# Patient Record
Sex: MALE | Race: WHITE | NOT HISPANIC OR LATINO | Employment: FULL TIME | URBAN - METROPOLITAN AREA
[De-identification: names, ages, dates, MRNs, and addresses within clinical notes are randomized per-mention and may not be internally consistent; named-entity substitution may affect disease eponyms.]

---

## 2019-02-02 ENCOUNTER — OFFICE VISIT (OUTPATIENT)
Dept: URGENT CARE | Facility: CLINIC | Age: 54
End: 2019-02-02
Payer: COMMERCIAL

## 2019-02-02 VITALS
SYSTOLIC BLOOD PRESSURE: 146 MMHG | TEMPERATURE: 98 F | RESPIRATION RATE: 18 BRPM | WEIGHT: 305 LBS | OXYGEN SATURATION: 98 % | HEART RATE: 88 BPM | DIASTOLIC BLOOD PRESSURE: 85 MMHG

## 2019-02-02 DIAGNOSIS — J01.90 ACUTE SINUSITIS, RECURRENCE NOT SPECIFIED, UNSPECIFIED LOCATION: Primary | ICD-10-CM

## 2019-02-02 DIAGNOSIS — H10.023 OTHER MUCOPURULENT CONJUNCTIVITIS OF BOTH EYES: ICD-10-CM

## 2019-02-02 PROCEDURE — 96372 THER/PROPH/DIAG INJ SC/IM: CPT | Mod: S$GLB,,, | Performed by: FAMILY MEDICINE

## 2019-02-02 PROCEDURE — 96372 PR INJECTION,THERAP/PROPH/DIAG2ST, IM OR SUBCUT: ICD-10-PCS | Mod: S$GLB,,, | Performed by: FAMILY MEDICINE

## 2019-02-02 PROCEDURE — 99214 PR OFFICE/OUTPT VISIT, EST, LEVL IV, 30-39 MIN: ICD-10-PCS | Mod: 25,S$GLB,, | Performed by: FAMILY MEDICINE

## 2019-02-02 PROCEDURE — 99214 OFFICE O/P EST MOD 30 MIN: CPT | Mod: 25,S$GLB,, | Performed by: FAMILY MEDICINE

## 2019-02-02 RX ORDER — BETAMETHASONE SODIUM PHOSPHATE AND BETAMETHASONE ACETATE 3; 3 MG/ML; MG/ML
9 INJECTION, SUSPENSION INTRA-ARTICULAR; INTRALESIONAL; INTRAMUSCULAR; SOFT TISSUE
Status: COMPLETED | OUTPATIENT
Start: 2019-02-02 | End: 2019-02-02

## 2019-02-02 RX ORDER — METFORMIN HYDROCHLORIDE 500 MG/1
TABLET ORAL
Refills: 3 | COMMUNITY
Start: 2019-01-03

## 2019-02-02 RX ORDER — ALLOPURINOL 100 MG/1
TABLET ORAL
Refills: 3 | COMMUNITY
Start: 2019-01-03

## 2019-02-02 RX ORDER — GENTAMICIN SULFATE 3 MG/ML
1 SOLUTION/ DROPS OPHTHALMIC EVERY 4 HOURS
Qty: 5 ML | Refills: 0 | Status: SHIPPED | OUTPATIENT
Start: 2019-02-02 | End: 2023-02-22

## 2019-02-02 RX ORDER — AMOXICILLIN AND CLAVULANATE POTASSIUM 875; 125 MG/1; MG/1
1 TABLET, FILM COATED ORAL 2 TIMES DAILY
Qty: 20 TABLET | Refills: 0 | Status: SHIPPED | OUTPATIENT
Start: 2019-02-02 | End: 2019-02-12

## 2019-02-02 RX ADMIN — BETAMETHASONE SODIUM PHOSPHATE AND BETAMETHASONE ACETATE 9 MG: 3; 3 INJECTION, SUSPENSION INTRA-ARTICULAR; INTRALESIONAL; INTRAMUSCULAR; SOFT TISSUE at 11:02

## 2019-02-02 NOTE — PROGRESS NOTES
Subjective:       Patient ID: Jeffrey Carlson is a 53 y.o. male.    Vitals:  weight is 138.3 kg (305 lb) (abnormal). His oral temperature is 98 °F (36.7 °C). His blood pressure is 146/85 (abnormal) and his pulse is 88. His respiration is 18 and oxygen saturation is 98%.     Chief Complaint: Sinus Problem and Eye Problem    This is a 53 y.o. male who presents today with a chief complaint of sinus pressure with drainage and bilateral eye discharge. Using Refresh eye drops. Taking otc generic cough meds and Aleve.      Sinus Problem   This is a new problem. The current episode started in the past 7 days. The problem has been gradually worsening since onset. There has been no fever. His pain is at a severity of 5/10. The pain is moderate. Associated symptoms include congestion, coughing, headaches, sinus pressure and sneezing. Pertinent negatives include no chills, diaphoresis, ear pain, hoarse voice, shortness of breath or sore throat. Past treatments include sitting up. The treatment provided no relief.   Eye Problem    Both eyes are affected.This is a new problem. The current episode started in the past 7 days. The problem occurs daily. The problem has been gradually worsening. There was no injury mechanism. The pain is at a severity of 4/10. The pain is mild. There is no known exposure to pink eye. Associated symptoms include an eye discharge, eye redness and itching. Pertinent negatives include no fever, nausea or vomiting. He has tried eye drops for the symptoms. The treatment provided no relief.       Constitution: Positive for fatigue. Negative for chills, sweating and fever.   HENT: Positive for congestion and sinus pressure. Negative for ear pain, sinus pain, sore throat and voice change.    Neck: Negative for painful lymph nodes.   Eyes: Positive for eye discharge, eye itching and eye redness.   Respiratory: Positive for cough and sputum production. Negative for chest tightness, bloody sputum, COPD,  shortness of breath, stridor, wheezing and asthma.    Gastrointestinal: Negative for nausea and vomiting.   Musculoskeletal: Negative for muscle ache.   Skin: Negative for rash.   Allergic/Immunologic: Positive for sneezing and flu shot. Negative for seasonal allergies and asthma.   Neurological: Positive for headaches.   Hematologic/Lymphatic: Negative for swollen lymph nodes.       Objective:      Physical Exam   Constitutional: He appears well-developed and well-nourished.   HENT:   Head: Normocephalic and atraumatic.   Eyes: EOM are normal. Pupils are equal, round, and reactive to light.   Neck: Normal range of motion. Neck supple.   Cardiovascular: Normal rate, regular rhythm and normal heart sounds.   Pulmonary/Chest: Effort normal and breath sounds normal.   Abdominal: Soft.   Nursing note and vitals reviewed.      Assessment:       1. Acute sinusitis, recurrence not specified, unspecified location    2. Other mucopurulent conjunctivitis of both eyes        Plan:         Acute sinusitis, recurrence not specified, unspecified location  -     betamethasone acetate-betamethasone sodium phosphate injection 9 mg  -     amoxicillin-clavulanate 875-125mg (AUGMENTIN) 875-125 mg per tablet; Take 1 tablet by mouth 2 (two) times daily. for 10 days  Dispense: 20 tablet; Refill: 0    Other mucopurulent conjunctivitis of both eyes  -     gentamicin (GARAMYCIN) 0.3 % ophthalmic solution; Place 1 drop into both eyes every 4 (four) hours.  Dispense: 5 mL; Refill: 0    recommend Mucinex OTC and increased fluids

## 2019-02-02 NOTE — PATIENT INSTRUCTIONS
Sinusitis (Antibiotic Treatment)    The sinuses are air-filled spaces within the bones of the face. They connect to the inside of the nose. Sinusitis is an inflammation of the tissue lining the sinus cavity. Sinus inflammation can occur during a cold. It can also be due to allergies to pollens and other particles in the air. Sinusitis can cause symptoms of sinus congestion and fullness. A sinus infection causes fever, headache and facial pain. There is often green or yellow drainage from the nose or into the back of the throat (post-nasal drip). You have been given antibiotics to treat this condition.  Home care:  · Take the full course of antibiotics as instructed. Do not stop taking them, even if you feel better.  · Drink plenty of water, hot tea, and other liquids. This may help thin mucus. It also may promote sinus drainage.  · Heat may help soothe painful areas of the face. Use a towel soaked in hot water. Or,  the shower and direct the hot spray onto your face. Using a vaporizer along with a menthol rub at night may also help.   · An expectorant containing guaifenesin may help thin the mucus and promote drainage from the sinuses.  · Over-the-counter decongestants may be used unless a similar medicine was prescribed. Nasal sprays work the fastest. Use one that contains phenylephrine or oxymetazoline. First blow the nose gently. Then use the spray. Do not use these medicines more often than directed on the label or symptoms may get worse. You may also use tablets containing pseudoephedrine. Avoid products that combine ingredients, because side effects may be increased. Read labels. You can also ask the pharmacist for help. (NOTE: Persons with high blood pressure should not use decongestants. They can raise blood pressure.)  · Over-the-counter antihistamines may help if allergies contributed to your sinusitis.    · Do not use nasal rinses or irrigation during an acute sinus infection, unless told to by  your health care provider. Rinsing may spread the infection to other sinuses.  · Use acetaminophen or ibuprofen to control pain, unless another pain medicine was prescribed. (If you have chronic liver or kidney disease or ever had a stomach ulcer, talk with your doctor before using these medicines. Aspirin should never be used in anyone under 18 years of age who is ill with a fever. It may cause severe liver damage.)  · Don't smoke. This can worsen symptoms.  Follow-up care  Follow up with your healthcare provider or our staff if you are not improving within the next week.  When to seek medical advice  Call your healthcare provider if any of these occur:  · Facial pain or headache becoming more severe  · Stiff neck  · Unusual drowsiness or confusion  · Swelling of the forehead or eyelids  · Vision problems, including blurred or double vision  · Fever of 100.4ºF (38ºC) or higher, or as directed by your healthcare provider  · Seizure  · Breathing problems  · Symptoms not resolving within 10 days  Date Last Reviewed: 4/13/2015  © 2623-3565 [employer]. [address] [place] [address], [place] [address]. All rights reserved. This information is not intended as a substitute for professional medical care. Always follow your healthcare professional's instructions.

## 2022-12-17 ENCOUNTER — HOSPITAL ENCOUNTER (INPATIENT)
Facility: HOSPITAL | Age: 57
LOS: 1 days | Discharge: HOME | DRG: 392 | End: 2022-12-19
Attending: EMERGENCY MEDICINE | Admitting: STUDENT IN AN ORGANIZED HEALTH CARE EDUCATION/TRAINING PROGRAM
Payer: COMMERCIAL

## 2022-12-17 DIAGNOSIS — K56.600 PARTIAL SMALL BOWEL OBSTRUCTION: ICD-10-CM

## 2022-12-17 DIAGNOSIS — R10.13 EPIGASTRIC PAIN: ICD-10-CM

## 2022-12-17 DIAGNOSIS — R10.9 ABDOMINAL PAIN: ICD-10-CM

## 2022-12-17 DIAGNOSIS — R19.7 DIARRHEA, UNSPECIFIED TYPE: Primary | ICD-10-CM

## 2022-12-17 LAB
ALBUMIN SERPL BCP-MCNC: 3.4 G/DL (ref 3.5–5.2)
ALP SERPL-CCNC: 53 U/L (ref 55–135)
ALT SERPL W/O P-5'-P-CCNC: 16 U/L (ref 10–44)
ANION GAP SERPL CALC-SCNC: 11 MMOL/L (ref 8–16)
AST SERPL-CCNC: 13 U/L (ref 10–40)
BASOPHILS # BLD AUTO: 0.09 K/UL (ref 0–0.2)
BASOPHILS NFR BLD: 0.8 % (ref 0–1.9)
BILIRUB SERPL-MCNC: 0.5 MG/DL (ref 0.1–1)
BILIRUB UR QL STRIP: NEGATIVE
BUN SERPL-MCNC: 14 MG/DL (ref 6–20)
CALCIUM SERPL-MCNC: 7.9 MG/DL (ref 8.7–10.5)
CHLORIDE SERPL-SCNC: 108 MMOL/L (ref 95–110)
CLARITY UR: CLEAR
CO2 SERPL-SCNC: 21 MMOL/L (ref 23–29)
COLOR UR: YELLOW
CREAT SERPL-MCNC: 0.7 MG/DL (ref 0.5–1.4)
DIFFERENTIAL METHOD: ABNORMAL
EOSINOPHIL # BLD AUTO: 0.1 K/UL (ref 0–0.5)
EOSINOPHIL NFR BLD: 1 % (ref 0–8)
ERYTHROCYTE [DISTWIDTH] IN BLOOD BY AUTOMATED COUNT: 13 % (ref 11.5–14.5)
EST. GFR  (NO RACE VARIABLE): >60 ML/MIN/1.73 M^2
GLUCOSE SERPL-MCNC: 169 MG/DL (ref 70–110)
GLUCOSE UR QL STRIP: NEGATIVE
HCT VFR BLD AUTO: 45.4 % (ref 40–54)
HGB BLD-MCNC: 15.3 G/DL (ref 14–18)
HGB UR QL STRIP: ABNORMAL
IMM GRANULOCYTES # BLD AUTO: 0.05 K/UL (ref 0–0.04)
IMM GRANULOCYTES NFR BLD AUTO: 0.5 % (ref 0–0.5)
KETONES UR QL STRIP: NEGATIVE
LEUKOCYTE ESTERASE UR QL STRIP: NEGATIVE
LIPASE SERPL-CCNC: 21 U/L (ref 4–60)
LYMPHOCYTES # BLD AUTO: 1.6 K/UL (ref 1–4.8)
LYMPHOCYTES NFR BLD: 15.3 % (ref 18–48)
MCH RBC QN AUTO: 32.6 PG (ref 27–31)
MCHC RBC AUTO-ENTMCNC: 33.7 G/DL (ref 32–36)
MCV RBC AUTO: 97 FL (ref 82–98)
MICROSCOPIC COMMENT: ABNORMAL
MONOCYTES # BLD AUTO: 1 K/UL (ref 0.3–1)
MONOCYTES NFR BLD: 9.3 % (ref 4–15)
NEUTROPHILS # BLD AUTO: 7.8 K/UL (ref 1.8–7.7)
NEUTROPHILS NFR BLD: 73.1 % (ref 38–73)
NITRITE UR QL STRIP: NEGATIVE
NRBC BLD-RTO: 0 /100 WBC
PH UR STRIP: 6 [PH] (ref 5–8)
PLATELET # BLD AUTO: 249 K/UL (ref 150–450)
PMV BLD AUTO: 11.8 FL (ref 9.2–12.9)
POTASSIUM SERPL-SCNC: 3.7 MMOL/L (ref 3.5–5.1)
PROT SERPL-MCNC: 6.4 G/DL (ref 6–8.4)
PROT UR QL STRIP: ABNORMAL
RBC # BLD AUTO: 4.7 M/UL (ref 4.6–6.2)
RBC #/AREA URNS HPF: 44 /HPF (ref 0–4)
SODIUM SERPL-SCNC: 140 MMOL/L (ref 136–145)
SP GR UR STRIP: 1.02 (ref 1–1.03)
TROPONIN I SERPL DL<=0.01 NG/ML-MCNC: 0.03 NG/ML (ref 0–0.03)
URN SPEC COLLECT METH UR: ABNORMAL
UROBILINOGEN UR STRIP-ACNC: NEGATIVE EU/DL
WBC # BLD AUTO: 10.7 K/UL (ref 3.9–12.7)

## 2022-12-17 PROCEDURE — 93005 ELECTROCARDIOGRAM TRACING: CPT

## 2022-12-17 PROCEDURE — G0378 HOSPITAL OBSERVATION PER HR: HCPCS

## 2022-12-17 PROCEDURE — 25000003 PHARM REV CODE 250: Performed by: EMERGENCY MEDICINE

## 2022-12-17 PROCEDURE — 80053 COMPREHEN METABOLIC PANEL: CPT | Performed by: EMERGENCY MEDICINE

## 2022-12-17 PROCEDURE — 83690 ASSAY OF LIPASE: CPT | Performed by: EMERGENCY MEDICINE

## 2022-12-17 PROCEDURE — 81000 URINALYSIS NONAUTO W/SCOPE: CPT | Performed by: EMERGENCY MEDICINE

## 2022-12-17 PROCEDURE — 93010 ELECTROCARDIOGRAM REPORT: CPT | Mod: ,,, | Performed by: INTERNAL MEDICINE

## 2022-12-17 PROCEDURE — 96361 HYDRATE IV INFUSION ADD-ON: CPT

## 2022-12-17 PROCEDURE — 96374 THER/PROPH/DIAG INJ IV PUSH: CPT

## 2022-12-17 PROCEDURE — 96376 TX/PRO/DX INJ SAME DRUG ADON: CPT

## 2022-12-17 PROCEDURE — 96375 TX/PRO/DX INJ NEW DRUG ADDON: CPT

## 2022-12-17 PROCEDURE — 84484 ASSAY OF TROPONIN QUANT: CPT | Performed by: EMERGENCY MEDICINE

## 2022-12-17 PROCEDURE — 85025 COMPLETE CBC W/AUTO DIFF WBC: CPT | Performed by: EMERGENCY MEDICINE

## 2022-12-17 PROCEDURE — 25500020 PHARM REV CODE 255: Performed by: EMERGENCY MEDICINE

## 2022-12-17 PROCEDURE — 99285 EMERGENCY DEPT VISIT HI MDM: CPT | Mod: 25

## 2022-12-17 PROCEDURE — 63600175 PHARM REV CODE 636 W HCPCS: Performed by: EMERGENCY MEDICINE

## 2022-12-17 PROCEDURE — 93010 EKG 12-LEAD: ICD-10-PCS | Mod: ,,, | Performed by: INTERNAL MEDICINE

## 2022-12-17 RX ORDER — SODIUM CHLORIDE 9 MG/ML
1000 INJECTION, SOLUTION INTRAVENOUS
Status: COMPLETED | OUTPATIENT
Start: 2022-12-17 | End: 2022-12-17

## 2022-12-17 RX ORDER — MORPHINE SULFATE 4 MG/ML
4 INJECTION, SOLUTION INTRAMUSCULAR; INTRAVENOUS
Status: COMPLETED | OUTPATIENT
Start: 2022-12-17 | End: 2022-12-17

## 2022-12-17 RX ORDER — FAMOTIDINE 10 MG/ML
20 INJECTION INTRAVENOUS EVERY 12 HOURS
Status: DISCONTINUED | OUTPATIENT
Start: 2022-12-17 | End: 2022-12-18

## 2022-12-17 RX ORDER — TALC
6 POWDER (GRAM) TOPICAL NIGHTLY PRN
Status: DISCONTINUED | OUTPATIENT
Start: 2022-12-17 | End: 2022-12-19 | Stop reason: HOSPADM

## 2022-12-17 RX ORDER — SODIUM CHLORIDE 0.9 % (FLUSH) 0.9 %
10 SYRINGE (ML) INJECTION
Status: DISCONTINUED | OUTPATIENT
Start: 2022-12-17 | End: 2022-12-19 | Stop reason: HOSPADM

## 2022-12-17 RX ORDER — MORPHINE SULFATE 4 MG/ML
4 INJECTION, SOLUTION INTRAMUSCULAR; INTRAVENOUS EVERY 4 HOURS PRN
Status: DISCONTINUED | OUTPATIENT
Start: 2022-12-17 | End: 2022-12-18

## 2022-12-17 RX ORDER — ONDANSETRON 2 MG/ML
4 INJECTION INTRAMUSCULAR; INTRAVENOUS
Status: COMPLETED | OUTPATIENT
Start: 2022-12-17 | End: 2022-12-17

## 2022-12-17 RX ORDER — ONDANSETRON 2 MG/ML
4 INJECTION INTRAMUSCULAR; INTRAVENOUS EVERY 8 HOURS PRN
Status: DISCONTINUED | OUTPATIENT
Start: 2022-12-17 | End: 2022-12-19 | Stop reason: HOSPADM

## 2022-12-17 RX ADMIN — SODIUM CHLORIDE 1000 ML: 0.9 INJECTION, SOLUTION INTRAVENOUS at 07:12

## 2022-12-17 RX ADMIN — IOHEXOL 100 ML: 350 INJECTION, SOLUTION INTRAVENOUS at 08:12

## 2022-12-17 RX ADMIN — ONDANSETRON 4 MG: 2 INJECTION INTRAMUSCULAR; INTRAVENOUS at 07:12

## 2022-12-17 RX ADMIN — MORPHINE SULFATE 4 MG: 4 INJECTION INTRAVENOUS at 10:12

## 2022-12-17 RX ADMIN — MORPHINE SULFATE 4 MG: 4 INJECTION INTRAVENOUS at 07:12

## 2022-12-17 RX ADMIN — ONDANSETRON 4 MG: 2 INJECTION INTRAMUSCULAR; INTRAVENOUS at 10:12

## 2022-12-17 RX ADMIN — FAMOTIDINE 20 MG: 10 INJECTION INTRAVENOUS at 10:12

## 2022-12-18 PROBLEM — R10.13 EPIGASTRIC PAIN: Status: ACTIVE | Noted: 2022-12-18

## 2022-12-18 LAB
POCT GLUCOSE: 120 MG/DL (ref 70–110)
POCT GLUCOSE: 138 MG/DL (ref 70–110)
POCT GLUCOSE: 154 MG/DL (ref 70–110)

## 2022-12-18 PROCEDURE — 94761 N-INVAS EAR/PLS OXIMETRY MLT: CPT

## 2022-12-18 PROCEDURE — 99234 HOSP IP/OBS SM DT SF/LOW 45: CPT | Mod: ,,, | Performed by: STUDENT IN AN ORGANIZED HEALTH CARE EDUCATION/TRAINING PROGRAM

## 2022-12-18 PROCEDURE — 99900035 HC TECH TIME PER 15 MIN (STAT)

## 2022-12-18 PROCEDURE — 96361 HYDRATE IV INFUSION ADD-ON: CPT

## 2022-12-18 PROCEDURE — 11000001 HC ACUTE MED/SURG PRIVATE ROOM

## 2022-12-18 PROCEDURE — 96376 TX/PRO/DX INJ SAME DRUG ADON: CPT

## 2022-12-18 PROCEDURE — 25000003 PHARM REV CODE 250: Performed by: EMERGENCY MEDICINE

## 2022-12-18 PROCEDURE — A4216 STERILE WATER/SALINE, 10 ML: HCPCS | Performed by: EMERGENCY MEDICINE

## 2022-12-18 PROCEDURE — 99234 PR OBSERV/HOSP SAME DATE,LEVL III: ICD-10-PCS | Mod: ,,, | Performed by: STUDENT IN AN ORGANIZED HEALTH CARE EDUCATION/TRAINING PROGRAM

## 2022-12-18 PROCEDURE — 25000003 PHARM REV CODE 250: Performed by: STUDENT IN AN ORGANIZED HEALTH CARE EDUCATION/TRAINING PROGRAM

## 2022-12-18 PROCEDURE — 63600175 PHARM REV CODE 636 W HCPCS: Performed by: EMERGENCY MEDICINE

## 2022-12-18 RX ORDER — IBUPROFEN 200 MG
16 TABLET ORAL
Status: DISCONTINUED | OUTPATIENT
Start: 2022-12-18 | End: 2022-12-19 | Stop reason: HOSPADM

## 2022-12-18 RX ORDER — GLUCAGON 1 MG
1 KIT INJECTION
Status: DISCONTINUED | OUTPATIENT
Start: 2022-12-18 | End: 2022-12-19 | Stop reason: HOSPADM

## 2022-12-18 RX ORDER — PANTOPRAZOLE SODIUM 40 MG/1
40 TABLET, DELAYED RELEASE ORAL DAILY
Status: DISCONTINUED | OUTPATIENT
Start: 2022-12-19 | End: 2022-12-19 | Stop reason: HOSPADM

## 2022-12-18 RX ORDER — INSULIN ASPART 100 [IU]/ML
0-5 INJECTION, SOLUTION INTRAVENOUS; SUBCUTANEOUS
Status: DISCONTINUED | OUTPATIENT
Start: 2022-12-18 | End: 2022-12-19 | Stop reason: HOSPADM

## 2022-12-18 RX ORDER — IBUPROFEN 200 MG
24 TABLET ORAL
Status: DISCONTINUED | OUTPATIENT
Start: 2022-12-18 | End: 2022-12-19 | Stop reason: HOSPADM

## 2022-12-18 RX ORDER — SODIUM CHLORIDE 9 MG/ML
INJECTION, SOLUTION INTRAVENOUS CONTINUOUS
Status: DISCONTINUED | OUTPATIENT
Start: 2022-12-18 | End: 2022-12-19 | Stop reason: HOSPADM

## 2022-12-18 RX ORDER — ACETAMINOPHEN 325 MG/1
650 TABLET ORAL EVERY 6 HOURS PRN
Status: DISCONTINUED | OUTPATIENT
Start: 2022-12-18 | End: 2022-12-19 | Stop reason: HOSPADM

## 2022-12-18 RX ADMIN — ACETAMINOPHEN 650 MG: 325 TABLET ORAL at 11:12

## 2022-12-18 RX ADMIN — MORPHINE SULFATE 4 MG: 4 INJECTION INTRAVENOUS at 09:12

## 2022-12-18 RX ADMIN — ACETAMINOPHEN 650 MG: 325 TABLET ORAL at 06:12

## 2022-12-18 RX ADMIN — Medication 6 MG: at 08:12

## 2022-12-18 RX ADMIN — FAMOTIDINE 20 MG: 10 INJECTION INTRAVENOUS at 09:12

## 2022-12-18 RX ADMIN — Medication 10 ML: at 06:12

## 2022-12-18 RX ADMIN — SODIUM CHLORIDE: 0.9 INJECTION, SOLUTION INTRAVENOUS at 12:12

## 2022-12-18 RX ADMIN — MORPHINE SULFATE 4 MG: 4 INJECTION INTRAVENOUS at 03:12

## 2022-12-18 RX ADMIN — Medication 6 MG: at 12:12

## 2022-12-18 RX ADMIN — SODIUM CHLORIDE: 0.9 INJECTION, SOLUTION INTRAVENOUS at 08:12

## 2022-12-18 RX ADMIN — Medication 10 ML: at 12:12

## 2022-12-18 NOTE — ED NOTES
Rec'd report from Everette HEARN RN. Pt is in semi-fowlers position in bed w/ c/o abd pain at this time. Pt is requesting pain medication. Pt is A & O x 3, denies SOB, respiratory distress and respirations are even and unlabored. Skin is warm and dry w/ pink mucosa. VS. Bed is locked and in the low position w/ the side rails up and locked for safety. Call bell @ BS. Will continue to monitor closely.

## 2022-12-18 NOTE — ED NOTES
Pt in the semi- watson's position, A & O x 3, watching TV, w/out complaint at this time. Pt denies SOB, respirations are even and unlabored. Skin is warm dry and pink. VS. Will continue to monitor closely.

## 2022-12-18 NOTE — ED NOTES
Pt is A & O x 3, appropriate and resting comfortably. Pt denies respiratory distress, respirations are even and unlabored. Skin is warm and dry w/ pink mucosa. VS.  Will continue to monitor closely.

## 2022-12-18 NOTE — ED PROVIDER NOTES
Encounter Date: 12/17/2022       History     Chief Complaint   Patient presents with    Diarrhea     That began on wed, after eating raw oysters on tues evening     Abdominal Pain     That began 2 days ago with fatigue, + fatigue also reported      Patient is a 57-year-old male who says he ate raw oysters 4 days ago, and then began with profuse diarrhea the next day.  Diarrhea has continued since then and is described as nonbloody.  He is had slight nausea but no vomiting.  He also complains of upper abdominal pain.  He also experienced subjective fever couple of days ago.    Review of patient's allergies indicates:  No Known Allergies  Past Medical History:   Diagnosis Date    Gout      Past Surgical History:   Procedure Laterality Date    HERNIA REPAIR      KNEE CARTILAGE SURGERY Right     NASAL SEPTUM SURGERY Bilateral     Congenital    WRIST SURGERY Right     Carpal Tunnel     Family History   Problem Relation Age of Onset    Cancer Father      Social History     Tobacco Use    Smoking status: Never    Smokeless tobacco: Never   Substance Use Topics    Alcohol use: Yes     Alcohol/week: 6.0 standard drinks     Types: 6 Cans of beer per week     Comment: ocassionally    Drug use: No     Review of Systems   Constitutional:         Subjective fever   Respiratory:  Negative for chest tightness.    Cardiovascular:  Negative for chest pain.   Gastrointestinal:  Positive for abdominal pain, diarrhea and nausea. Negative for vomiting.   Genitourinary:  Negative for difficulty urinating.   Musculoskeletal:  Negative for back pain.   Skin:  Negative for color change and rash.   Neurological:  Negative for dizziness.     Physical Exam     Initial Vitals [12/17/22 1700]   BP Pulse Resp Temp SpO2   (!) 145/72 100 18 98.4 °F (36.9 °C) 98 %      MAP       --         Physical Exam    Nursing note and vitals reviewed.  Constitutional: No distress.   HENT:   Head: Atraumatic.   Eyes: EOM are normal.   Neck: Neck supple.    Cardiovascular:  Normal rate, regular rhythm and normal heart sounds.           Pulmonary/Chest: Breath sounds normal.   Abdominal: Abdomen is soft. Bowel sounds are normal.   Large ventral hernia.  Midepigastric tenderness without guarding or rebound.   Musculoskeletal:         General: No edema. Normal range of motion.      Cervical back: Neck supple.     Neurological: He is alert and oriented to person, place, and time.   Skin: Skin is warm and dry.   Psychiatric: Thought content normal.       ED Course   Procedures  Labs Reviewed   CBC W/ AUTO DIFFERENTIAL - Abnormal; Notable for the following components:       Result Value    MCH 32.6 (*)     Gran # (ANC) 7.8 (*)     Immature Grans (Abs) 0.05 (*)     Gran % 73.1 (*)     Lymph % 15.3 (*)     All other components within normal limits   COMPREHENSIVE METABOLIC PANEL - Abnormal; Notable for the following components:    CO2 21 (*)     Glucose 169 (*)     Calcium 7.9 (*)     Albumin 3.4 (*)     Alkaline Phosphatase 53 (*)     All other components within normal limits   URINALYSIS - Abnormal; Notable for the following components:    Protein, UA Trace (*)     Occult Blood UA 3+ (*)     All other components within normal limits   URINALYSIS MICROSCOPIC - Abnormal; Notable for the following components:    RBC, UA 44 (*)     All other components within normal limits   LIPASE   TROPONIN I     EKG Readings: (Independently Interpreted)   Initial Reading: No STEMI. Rhythm: Normal Sinus Rhythm. Heart Rate: 72. Conduction: RBBB. T Waves Flipped: AVR, V1, V2 and V3.     Imaging Results              CT Abdomen Pelvis With Contrast (Final result)  Result time 12/17/22 20:46:25      Final result by Andi Krueger DO (12/17/22 20:46:25)                   Impression:      1. Mildly prominent loops of small bowel without transition point. Findings may be related to enteritis versus a low-grade partial bowel obstruction.  No evidence of bowel wall thickening or pneumatosis.  2.  Nonobstructing left renal calculus.  No hydronephrosis.  3. Indeterminate bilateral adrenal nodules.  Recommend further characterization with nonemergent MRI.  4. Trace pelvic ascites.      Electronically signed by: Andi Krueger  Date:    12/17/2022  Time:    20:46               Narrative:    EXAMINATION:  CT ABDOMEN PELVIS WITH CONTRAST    CLINICAL HISTORY:  Bowel obstruction suspected;    TECHNIQUE:  Axial CT images with sagittal and coronal reformats were obtained of the abdomen and pelvis from the hemidiaphragms through the symphysis pubis after the administration of 100mL Omnipaque 350.    COMPARISON:  None available.    FINDINGS:  Lung Bases: Clear.    Heart: Heart size is normal.  No pericardial effusion.    Liver: There is hypoattenuation of the hepatic parenchyma which can be seen with hepatic steatosis.  No focal hepatic lesion.  The portal vasculature is patent.    Biliary tract: No intrahepatic or extrahepatic biliary ductal dilatation.    Gallbladder: No radiodense gallstone. No wall thickening or pericholecystic fluid.    Pancreas: Normal. No pancreatic ductal dilatation.    Spleen: Normal size without focal lesion.    Adrenals: There is a 2.0 cm indeterminate nodule in the left adrenal gland and a 1.8 cm indeterminate nodule in the right adrenal gland.    Kidneys and urinary collecting systems: There is a small simple cyst in the right kidney superior pole and a simple peripelvic cyst in the right kidney.  There is a 3 mm nonobstructing left renal calculus.  There is no hydronephrosis or obstructing ureteral stone.  Nonspecific symmetric perinephric fat stranding noted.    Lymph nodes: None enlarged.    Stomach and bowel: The stomach is normal.  There are multiple mildly prominent loops of small bowel without transition point.  Findings may be related to enteritis.  Low-grade partial bowel obstruction is difficult to entirely exclude.  There is no evidence of bowel wall thickening or pneumatosis.   Large bowel loops are normal.  The appendix is normal.    Peritoneum and mesentery: There is trace pelvic ascites.  There is no focal fluid collection.  No free intraperitoneal air.    Vasculature: Mild calcified atherosclerosis.  No aneurysm.    Urinary bladder: No wall thickening.    Reproductive organs: The prostate is mildly enlarged.    Body wall: No abnormality.    Musculoskeletal: No aggressive osseous lesion.                                       Medications   sodium chloride 0.9% flush 10 mL (10 mLs Intravenous Given 12/18/22 1232)   melatonin tablet 6 mg (6 mg Oral Given 12/18/22 0007)   famotidine (PF) injection 20 mg (20 mg Intravenous Given 12/18/22 0902)   ondansetron injection 4 mg (4 mg Intravenous Given 12/17/22 2251)   morphine injection 4 mg (4 mg Intravenous Given 12/18/22 0946)   influenza (QUADRIVALENT PF) vaccine 0.5 mL (has no administration in time range)   0.9%  NaCl infusion ( Intravenous New Bag 12/18/22 1247)   glucose chewable tablet 16 g (has no administration in time range)   glucose chewable tablet 24 g (has no administration in time range)   glucagon (human recombinant) injection 1 mg (has no administration in time range)   dextrose 10% bolus 125 mL 125 mL (has no administration in time range)   dextrose 10% bolus 250 mL 250 mL (has no administration in time range)   insulin aspart U-100 pen 0-5 Units (has no administration in time range)   0.9%  NaCl infusion (0 mLs Intravenous Stopped 12/17/22 2023)   ondansetron injection 4 mg (4 mg Intravenous Given 12/17/22 1905)   morphine injection 4 mg (4 mg Intravenous Given 12/17/22 1920)   iohexoL (OMNIPAQUE 350) injection 100 mL (100 mLs Intravenous Given 12/17/22 2028)     Medical Decision Making:   Initial Assessment:   57-year-old male with abdominal pain and diarrhea after eating oysters.  Differential Diagnosis:   Gastroenteritis, cholelithiasis, cholecystitis, peptic ulcer disease, small-bowel obstruction.  Clinical Tests:   Lab  Tests: Ordered and Reviewed  The following lab test(s) were unremarkable: CBC, Lipase, CMP and Urinalysis       <> Summary of Lab: CBC unremarkable.  Chemistry with a glucose of 169.   ED Management:  Labs are essentially unremarkable.  Abdominal CT has been ordered.  Results of this are pending at the time of shift change and therefore further care of the patient will be turned over to Dr. Melo, pending results and final disposition.                        Clinical Impression:   Final diagnoses:  [R10.9] Abdominal pain  [R19.7] Diarrhea, unspecified type (Primary)  [K56.600] Partial small bowel obstruction        ED Disposition Condition    Observation Stable                Benny Livingston MD  12/18/22 0225

## 2022-12-18 NOTE — ED NOTES
Patient presents to ED from home with complaints of abdominal pain that began two days ago after patient consumed raw oysters the day before. Patient states his pain is located to his epigastric region. And that he was diaphoretic upon waking up this morning. Denies chest pain or SOB. Skin warm and dry without discoloration at this time. Abdomen appears rounded and distended, but is soft and non-tender. Patient AAOx4 and ambulatory.

## 2022-12-18 NOTE — PROGRESS NOTES
12/18/22 0007   Admission   Initial VN Admission Questions Complete   Communication Issues? None   Shift   Virtual Nurse - Patient Verbalized Approval Of Camera Use   Safety/Activity   Patient Rounds visualized patient   Safety Promotion/Fall Prevention instructed to call staff for mobility;side rails raised x 2;Fall Risk reviewed with patient/family   VIRTUAL NURSE: Admission questions completed with patient at this time. Care plan and safety precautions reviewed. Pt verbalized no complaints, no needs expressed. Instructed to use call light for assistance, he  verbalized understanding.

## 2022-12-18 NOTE — ED NOTES
Pt is A & O x 3, denies distress @ this time, states some pain is still present. Pt denies SOB, respirations are even and unlabored. Skin remains warm, dry and pink. VS. Will continue to monitor closely.

## 2022-12-18 NOTE — ASSESSMENT & PLAN NOTE
Enteritis vs partial SBO  Patient with flatus this AM   FLD   Pepcid BID   IVFs   Encourage ambulation  Anticipated discharge today or early tomorrow pending abdominal pain and diet tolerance  OP referral to GI for GERD

## 2022-12-18 NOTE — H&P
[facility]  General Surgery  History & Physical    Patient Name: Jeffrey Carlson  MRN: [id number]  Admission Date: 12/17/2022  Attending Physician: Denisse Basurto MD   Primary Care Provider: Primary Doctor No    Patient information was obtained from patient, spouse/SO and ER records.     Subjective:     Chief Complaint/Reason for Admission: Epigastric pain with diarrhea    History of Present Illness: 56yo male with history of ventral hernia repair, DMII, obesity and GERD who presents with three day history of severe epigastric pain and diarrhea.  Says pain began after eating oysters.  Reported to ED where CT abd/pelvis demonstrated dilated loops of small bowel concerning for enteritis vs partial SBO.  Last BM the evening prior to presentation.  Endorses flatus since admission. Says abdominal pain alleviated with pepcid.        No current facility-administered medications on file prior to encounter.     Current Outpatient Medications on File Prior to Encounter   Medication Sig    allopurinol (ZYLOPRIM) 100 MG tablet TAKE 1 TABLET TUES,THURS,SAT,SUN AND 1 & 1/2 TAB MWF TO LOWER URIC ACID AND PREVENT GOUT    gentamicin (GARAMYCIN) 0.3 % ophthalmic solution Place 1 drop into both eyes every 4 (four) hours.    metFORMIN (GLUCOPHAGE) 500 MG tablet TAKE 1 TABLET BY MOUTH TWICE DAILY WITH FOOD FOR BLOOD GLUCOSE       Review of patient's allergies indicates:  No Known Allergies    Past Medical History:   Diagnosis Date    Gout      Past Surgical History:   Procedure Laterality Date    HERNIA REPAIR      KNEE CARTILAGE SURGERY Right     NASAL SEPTUM SURGERY Bilateral     Congenital    WRIST SURGERY Right     Carpal Tunnel     Family History       Problem Relation (Age of Onset)    Cancer Father          Tobacco Use    Smoking status: Never    Smokeless tobacco: Never   Substance and Sexual Activity    Alcohol use: Yes     Alcohol/week: 6.0 standard drinks     Types: 6 Cans of beer per week     Comment:  ocassionally    Drug use: No    Sexual activity: Not on file     Review of Systems   Constitutional:  Negative for activity change, appetite change, chills and fever.   Respiratory:  Negative for chest tightness and shortness of breath.    Cardiovascular:  Negative for chest pain.   Gastrointestinal:  Positive for abdominal pain and diarrhea. Negative for abdominal distention, anal bleeding, blood in stool, constipation, nausea, rectal pain and vomiting.   Objective:     Vital Signs (Most Recent):  Temp: 97.8 °F (36.6 °C) (12/18/22 1217)  Pulse: 66 (12/18/22 1217)  Resp: 16 (12/18/22 1217)  BP: (!) 142/73 (12/18/22 1217)  SpO2: 95 % (12/18/22 1217)   Vital Signs (24h Range):  Temp:  [97.7 °F (36.5 °C)-98.8 °F (37.1 °C)] 97.8 °F (36.6 °C)  Pulse:  [] 66  Resp:  [13-20] 16  SpO2:  [93 %-98 %] 95 %  BP: (133-159)/(63-75) 142/73     Weight: (!) 142.8 kg (314 lb 13.1 oz)  Body mass index is 42.7 kg/m².    Physical Exam  Vitals reviewed.   Constitutional:       General: He is not in acute distress.     Appearance: He is obese. He is not ill-appearing or toxic-appearing.   Cardiovascular:      Rate and Rhythm: Normal rate and regular rhythm.   Pulmonary:      Effort: Pulmonary effort is normal. No respiratory distress.   Abdominal:      General: There is no distension.      Palpations: Abdomen is soft.      Tenderness: There is no abdominal tenderness. There is no guarding or rebound.   Musculoskeletal:      Right lower leg: No edema.      Left lower leg: No edema.   Skin:     General: Skin is warm and dry.      Capillary Refill: Capillary refill takes less than 2 seconds.   Neurological:      Mental Status: He is alert. Mental status is at baseline.       Significant Labs:  I have reviewed all pertinent lab results within the past 24 hours.  CBC:   Recent Labs   Lab 12/17/22 1904   WBC 10.70   RBC 4.70   HGB 15.3   HCT 45.4      MCV 97   MCH 32.6*   MCHC 33.7     CMP:   Recent Labs   Lab 12/17/22 1904    *   CALCIUM 7.9*   ALBUMIN 3.4*   PROT 6.4      K 3.7   CO2 21*      BUN 14   CREATININE 0.7   ALKPHOS 53*   ALT 16   AST 13   BILITOT 0.5       Significant Diagnostics:  I have reviewed all pertinent imaging results/findings within the past 24 hours.      Assessment/Plan:     Epigastric pain  Enteritis vs partial SBO  Patient with flatus this AM   FLD   Pepcid BID   IVFs   Encourage ambulation  Anticipated discharge today or early tomorrow pending abdominal pain and diet tolerance  OP referral to GI for GERD       VTE Risk Mitigation (From admission, onward)         Ordered     IP VTE HIGH RISK PATIENT  Once         12/17/22 2318     Place sequential compression device  Until discontinued         12/17/22 2318                Denisse Basurto MD  General Surgery  [facility]

## 2022-12-18 NOTE — HPI
56yo male with history of ventral hernia repair, DMII, obesity and GERD who presents with three day history of severe epigastric pain and diarrhea.  Says pain began after eating oysters.  Reported to ED where CT abd/pelvis demonstrated dilated loops of small bowel concerning for enteritis vs partial SBO.  Last BM the evening prior to presentation.  Endorses flatus since admission. Says abdominal pain alleviated with pepcid.

## 2022-12-18 NOTE — PLAN OF CARE
Patient AAOx3 with complaints of pain to upper abdomen. Patient ambulated to shower without assistance. Safety maintained. Will continue to monitor.

## 2022-12-19 VITALS
BODY MASS INDEX: 42.64 KG/M2 | OXYGEN SATURATION: 97 % | HEART RATE: 72 BPM | SYSTOLIC BLOOD PRESSURE: 142 MMHG | RESPIRATION RATE: 20 BRPM | HEIGHT: 72 IN | DIASTOLIC BLOOD PRESSURE: 65 MMHG | WEIGHT: 314.81 LBS | TEMPERATURE: 98 F

## 2022-12-19 PROBLEM — R19.7 DIARRHEA: Status: ACTIVE | Noted: 2022-12-19

## 2022-12-19 LAB — POCT GLUCOSE: 124 MG/DL (ref 70–110)

## 2022-12-19 PROCEDURE — 99239 HOSP IP/OBS DSCHRG MGMT >30: CPT | Mod: ,,, | Performed by: SURGERY

## 2022-12-19 PROCEDURE — 63600175 PHARM REV CODE 636 W HCPCS: Performed by: STUDENT IN AN ORGANIZED HEALTH CARE EDUCATION/TRAINING PROGRAM

## 2022-12-19 PROCEDURE — 90471 IMMUNIZATION ADMIN: CPT | Performed by: STUDENT IN AN ORGANIZED HEALTH CARE EDUCATION/TRAINING PROGRAM

## 2022-12-19 PROCEDURE — 25000003 PHARM REV CODE 250: Performed by: STUDENT IN AN ORGANIZED HEALTH CARE EDUCATION/TRAINING PROGRAM

## 2022-12-19 PROCEDURE — 94761 N-INVAS EAR/PLS OXIMETRY MLT: CPT

## 2022-12-19 PROCEDURE — 99239 PR HOSPITAL DISCHARGE DAY,>30 MIN: ICD-10-PCS | Mod: ,,, | Performed by: SURGERY

## 2022-12-19 PROCEDURE — 90686 IIV4 VACC NO PRSV 0.5 ML IM: CPT | Performed by: STUDENT IN AN ORGANIZED HEALTH CARE EDUCATION/TRAINING PROGRAM

## 2022-12-19 RX ADMIN — INFLUENZA VIRUS VACCINE 0.5 ML: 15; 15; 15; 15 SUSPENSION INTRAMUSCULAR at 01:12

## 2022-12-19 RX ADMIN — SODIUM CHLORIDE: 0.9 INJECTION, SOLUTION INTRAVENOUS at 08:12

## 2022-12-19 RX ADMIN — PANTOPRAZOLE SODIUM 40 MG: 40 TABLET, DELAYED RELEASE ORAL at 08:12

## 2022-12-19 NOTE — PLAN OF CARE
NJ met with pt at bedside to complete DCA this AM. Pt stated that he lives at home with wife Edel [phone] and will have his wife help transport him home at time of d/c. Pt reported no HME at time of d/c. NJ requested f/u appts. White board updated with CM name and contact information.  Discharge brochure provided.  Pt encouraged to call with any questions or concerns.  Cm will continue to follow pt through transitions of care and assist with any discharge needs.    JACKELIN Urena  [phone]       12/19/22 0949   Discharge Assessment   Assessment Type Discharge Planning Assessment   Confirmed/corrected address, phone number and insurance Yes   Confirmed Demographics Correct on Facesheet   Source of Information patient   When was your last doctors appointment?   (per pt feburary)   Communicated BHAVYA with patient/caregiver Yes   Reason For Admission Partial Small bowel obstruction   People in Home spouse   Facility Arrived From: home   Do you expect to return to your current living situation? Yes   Do you have help at home or someone to help you manage your care at home? Yes   Who are your caregiver(s) and their phone number(s)? wife Edel [phone]   Prior to hospitilization cognitive status: Alert/Oriented   Current cognitive status: Alert/Oriented   Home Accessibility wheelchair accessible   Home Layout Able to live on 1st floor   Equipment Currently Used at Home none   Readmission within 30 days? No   Patient currently being followed by outpatient case management? No   Do you currently have service(s) that help you manage your care at home? No   Do you take prescription medications? Yes   Do you have prescription coverage? Yes   Coverage [employer]   Do you have any problems affording any of your prescribed medications? No   Is the patient taking medications as prescribed? yes   Who is going to help you get home at discharge? wife Edel [phone]   How do you get to doctors appointments? car, drives self    Are you on dialysis? No   Do you take coumadin? No   Discharge Plan A Home with family   Discharge Plan discussed with: Patient   Discharge Barriers Identified None   OTHER   Name(s) of People in Home wife Edel [phone]

## 2022-12-19 NOTE — DISCHARGE SUMMARY
[facility]  General Surgery  Discharge Summary      Patient Name: Jeffrey Carlson  MRN: [id number]  Admission Date: 12/17/2022  Hospital Length of Stay: 1 days  Discharge Date and Time:  12/19/2022 12:54 PM  Attending Physician: Anuel Irwin MD   Discharging Provider: Arjun Matos MD  Primary Care Provider: Primary Doctor No     HPI:  58yo male with history of ventral hernia repair, DMII, obesity and GERD who presents with three day history of severe epigastric pain and diarrhea.  Says pain began after eating oysters.  Reported to ED where CT abd/pelvis demonstrated dilated loops of small bowel concerning for enteritis vs partial SBO.  Last BM the evening prior to presentation.  Endorses flatus since admission. Says abdominal pain alleviated with pepcid.      * No surgery found *     Hospital Course:   Please see daily progress notes for full hospital course. Briefly, the patient presented for the above reasons. Patient was managed with conservative care and patient progressed well. Ambulating without issue.  Voiding without issue with adequate urine output. Passing gas and stool.  Tolerating diet without nausea or vomiting.  Incision site is clean, dry, and intact.  Discharged on 12/19/22.      Consults:     Significant Diagnostic Studies: Labs: CMP   Recent Labs   Lab 12/17/22  1904      K 3.7      CO2 21*   *   BUN 14   CREATININE 0.7   CALCIUM 7.9*   PROT 6.4   ALBUMIN 3.4*   BILITOT 0.5   ALKPHOS 53*   AST 13   ALT 16   ANIONGAP 11    and CBC   Recent Labs   Lab 12/17/22  1904   WBC 10.70   HGB 15.3   HCT 45.4          Pending Diagnostic Studies:       None          Final Active Diagnoses:    Diagnosis Date Noted POA    PRINCIPAL PROBLEM:  Epigastric pain [R10.13] 12/18/2022 Yes    Diarrhea [R19.7] 12/19/2022 Yes      Problems Resolved During this Admission:      Discharged Condition: good    Disposition: Home or Self Care    Follow Up: PRN with surgery, should follow up  with PCP    Patient Instructions:      Notify your health care provider if you experience any of the following:  temperature >100.4     Notify your health care provider if you experience any of the following:  persistent nausea and vomiting or diarrhea     Notify your health care provider if you experience any of the following:  severe uncontrolled pain     Notify your health care provider if you experience any of the following:  redness, tenderness, or signs of infection (pain, swelling, redness, odor or green/yellow discharge around incision site)     Notify your health care provider if you experience any of the following:  difficulty breathing or increased cough     Notify your health care provider if you experience any of the following:  severe persistent headache     Notify your health care provider if you experience any of the following:  worsening rash     Notify your health care provider if you experience any of the following:  persistent dizziness, light-headedness, or visual disturbances     Notify your health care provider if you experience any of the following:  increased confusion or weakness     Activity as tolerated     Medications:  Reconciled Home Medications:      Medication List        CONTINUE taking these medications      allopurinoL 100 MG tablet  Commonly known as: ZYLOPRIM  TAKE 1 TABLET TUES,THURS,SAT,SUN AND 1 & 1/2 TAB MWF TO LOWER URIC ACID AND PREVENT GOUT     gentamicin 0.3 % ophthalmic solution  Commonly known as: GARAMYCIN  Place 1 drop into both eyes every 4 (four) hours.     metFORMIN 500 MG tablet  Commonly known as: GLUCOPHAGE  TAKE 1 TABLET BY MOUTH TWICE DAILY WITH FOOD FOR BLOOD GLUCOSE              Arjun Matos MD  General Surgery  [place] [facility]

## 2022-12-19 NOTE — PROGRESS NOTES
[facility]  General Surgery  Progress Note    Subjective:     History of Present Illness:  56yo male with history of ventral hernia repair, DMII, obesity and GERD who presents with three day history of severe epigastric pain and diarrhea.  Says pain began after eating oysters.  Reported to ED where CT abd/pelvis demonstrated dilated loops of small bowel concerning for enteritis vs partial SBO.  Last BM the evening prior to presentation.  Endorses flatus since admission. Says abdominal pain alleviated with pepcid.        Post-Op Info:  * No surgery found *         Interval History:   No acute issues overnight. Afebrile. VSS  Pain controlled well on regimen  no nausea / vomiting  Passing flatus but no BM yet.   Making adequate urine  Tolerating clears without issue      Medications:  Continuous Infusions:   sodium chloride 0.9% 75 mL/hr at 12/19/22 0851     Scheduled Meds:   pantoprazole  40 mg Oral Daily     PRN Meds:acetaminophen, dextrose 10%, dextrose 10%, glucagon (human recombinant), glucose, glucose, influenza, insulin aspart U-100, melatonin, ondansetron, sodium chloride 0.9%     Review of patient's allergies indicates:  No Known Allergies  Objective:     Vital Signs (Most Recent):  Temp: 97.6 °F (36.4 °C) (12/19/22 0736)  Pulse: 76 (12/19/22 0736)  Resp: (!) 22 (12/19/22 0736)  BP: (!) 145/83 (12/19/22 0736)  SpO2: 95 % (12/19/22 0736)   Vital Signs (24h Range):  Temp:  [97.5 °F (36.4 °C)-98.8 °F (37.1 °C)] 97.6 °F (36.4 °C)  Pulse:  [66-76] 76  Resp:  [15-22] 22  SpO2:  [95 %-98 %] 95 %  BP: (134-196)/(62-88) 145/83     Weight: (!) 142.8 kg (314 lb 13.1 oz)  Body mass index is 42.7 kg/m².    Intake/Output - Last 3 Shifts         12/17 0700 12/18 0659 12/18 0700 12/19 0659 12/19 0700 12/20 0659    P.O. 30 240     I.V. (mL/kg)  1481.9 (10.4)     Total Intake(mL/kg) 30 (0.2) 1721.9 (12.1)     Urine (mL/kg/hr)  650 (0.2)     Emesis/NG output  0     Other  0     Stool  0     Total Output  650     Net  +30 +1071.9            Urine Occurrence 1 x      Stool Occurrence 0 x 0 x     Emesis Occurrence  0 x             Physical Exam  Vitals reviewed.   Constitutional:       General: He is not in acute distress.     Appearance: He is obese. He is not ill-appearing or toxic-appearing.   Cardiovascular:      Rate and Rhythm: Normal rate and regular rhythm.   Pulmonary:      Effort: Pulmonary effort is normal. No respiratory distress.   Abdominal:      General: There is no distension.      Palpations: Abdomen is soft.      Tenderness: There is no abdominal tenderness. There is no guarding or rebound.   Musculoskeletal:      Right lower leg: No edema.      Left lower leg: No edema.   Skin:     General: Skin is warm and dry.      Capillary Refill: Capillary refill takes less than 2 seconds.   Neurological:      Mental Status: He is alert. Mental status is at baseline.       Significant Labs:  I have reviewed all pertinent lab results within the past 24 hours.  CBC:   Recent Labs   Lab 12/17/22 1904   WBC 10.70   RBC 4.70   HGB 15.3   HCT 45.4      MCV 97   MCH 32.6*   MCHC 33.7     CMP:   Recent Labs   Lab 12/17/22 1904   *   CALCIUM 7.9*   ALBUMIN 3.4*   PROT 6.4      K 3.7   CO2 21*      BUN 14   CREATININE 0.7   ALKPHOS 53*   ALT 16   AST 13   BILITOT 0.5       Significant Diagnostics:  I have reviewed all pertinent imaging results/findings within the past 24 hours.    Assessment/Plan:     Epigastric pain  Patient with flatus this AM, still no BM  Advance to regular diet today  Pepcid BID   IVFs   Encourage ambulation    Dispo: will consider d/c later today if tolerates regular diet today         Arjun Matos MD  General Surgery  [facility]    Pt seen and examined.  Agree with note and plan.  Pt with likely food poisoning vs viral GE  DC home after tolerates solid food    Anuel Irwin M.D., F.A.C.S.  Phvpoy-Qsyslglqd-Hmfcuho and General Surgery  [facility] [facility] [place]

## 2022-12-19 NOTE — PLAN OF CARE
SW met with pt at bedside to complete final assessment. Pt stated that at time of d/c his mom will transport him home at time of d/c. SW requested f/u appts for pt. Rounds completed on pt.  All questions addressed.  Bedside nurse to discuss d/c medications.  Discussed importance to attend all f/u appts and take medications as prescribed.  Verbalized understanding.    JACKELIN Urena  [phone]    Future Appointments   Date Time Provider Department Center   12/27/2022 11:00 AM Anuel Irwin MD [facility]        12/19/22 1411   Final Note   Assessment Type Final Discharge Note   Anticipated Discharge Disposition Home   What phone number can be called within the next 1-3 days to see how you are doing after discharge? [phone]   Hospital Resources/Appts/Education Provided Appointments scheduled and added to AVS   Post-Acute Status   Coverage [employer]   Discharge Delays None known at this time

## 2022-12-19 NOTE — SUBJECTIVE & OBJECTIVE
Interval History:   No acute issues overnight. Afebrile. VSS  Pain controlled well on regimen  no nausea / vomiting  Passing flatus but no BM yet.   Making adequate urine  Tolerating clears without issue      Medications:  Continuous Infusions:   sodium chloride 0.9% 75 mL/hr at 12/19/22 0851     Scheduled Meds:   pantoprazole  40 mg Oral Daily     PRN Meds:acetaminophen, dextrose 10%, dextrose 10%, glucagon (human recombinant), glucose, glucose, influenza, insulin aspart U-100, melatonin, ondansetron, sodium chloride 0.9%     Review of patient's allergies indicates:  No Known Allergies  Objective:     Vital Signs (Most Recent):  Temp: 97.6 °F (36.4 °C) (12/19/22 0736)  Pulse: 76 (12/19/22 0736)  Resp: (!) 22 (12/19/22 0736)  BP: (!) 145/83 (12/19/22 0736)  SpO2: 95 % (12/19/22 0736)   Vital Signs (24h Range):  Temp:  [97.5 °F (36.4 °C)-98.8 °F (37.1 °C)] 97.6 °F (36.4 °C)  Pulse:  [66-76] 76  Resp:  [15-22] 22  SpO2:  [95 %-98 %] 95 %  BP: (134-196)/(62-88) 145/83     Weight: (!) 142.8 kg (314 lb 13.1 oz)  Body mass index is 42.7 kg/m².    Intake/Output - Last 3 Shifts         12/17 0700 12/18 0659 12/18 0700 12/19 0659 12/19 0700 12/20 0659    P.O. 30 240     I.V. (mL/kg)  1481.9 (10.4)     Total Intake(mL/kg) 30 (0.2) 1721.9 (12.1)     Urine (mL/kg/hr)  650 (0.2)     Emesis/NG output  0     Other  0     Stool  0     Total Output  650     Net +30 +1071.9            Urine Occurrence 1 x      Stool Occurrence 0 x 0 x     Emesis Occurrence  0 x             Physical Exam  Vitals reviewed.   Constitutional:       General: He is not in acute distress.     Appearance: He is obese. He is not ill-appearing or toxic-appearing.   Cardiovascular:      Rate and Rhythm: Normal rate and regular rhythm.   Pulmonary:      Effort: Pulmonary effort is normal. No respiratory distress.   Abdominal:      General: There is no distension.      Palpations: Abdomen is soft.      Tenderness: There is no abdominal tenderness. There is no  guarding or rebound.   Musculoskeletal:      Right lower leg: No edema.      Left lower leg: No edema.   Skin:     General: Skin is warm and dry.      Capillary Refill: Capillary refill takes less than 2 seconds.   Neurological:      Mental Status: He is alert. Mental status is at baseline.       Significant Labs:  I have reviewed all pertinent lab results within the past 24 hours.  CBC:   Recent Labs   Lab 12/17/22  1904   WBC 10.70   RBC 4.70   HGB 15.3   HCT 45.4      MCV 97   MCH 32.6*   MCHC 33.7     CMP:   Recent Labs   Lab 12/17/22  1904   *   CALCIUM 7.9*   ALBUMIN 3.4*   PROT 6.4      K 3.7   CO2 21*      BUN 14   CREATININE 0.7   ALKPHOS 53*   ALT 16   AST 13   BILITOT 0.5       Significant Diagnostics:  I have reviewed all pertinent imaging results/findings within the past 24 hours.

## 2022-12-19 NOTE — PLAN OF CARE
Up ad franchesca to toilet,aao,vss,pt took tylenol for headache and slight abd pain w relief,no current c/o n/v,poc reviewed stated understanding.

## 2022-12-19 NOTE — ASSESSMENT & PLAN NOTE
Patient with flatus this AM, still no BM  Advance to regular diet today  Pepcid BID   IVFs   Encourage ambulation    Dispo: will consider d/c later today if tolerates regular diet today

## 2022-12-27 ENCOUNTER — OFFICE VISIT (OUTPATIENT)
Dept: SURGERY | Facility: CLINIC | Age: 57
End: 2022-12-27
Payer: COMMERCIAL

## 2022-12-27 VITALS
WEIGHT: 315 LBS | DIASTOLIC BLOOD PRESSURE: 85 MMHG | BODY MASS INDEX: 43.25 KG/M2 | SYSTOLIC BLOOD PRESSURE: 158 MMHG | HEART RATE: 95 BPM

## 2022-12-27 DIAGNOSIS — A05.9 FOOD POISONING: Primary | ICD-10-CM

## 2022-12-27 PROCEDURE — 3008F PR BODY MASS INDEX (BMI) DOCUMENTED: ICD-10-PCS | Mod: CPTII,S$GLB,, | Performed by: SURGERY

## 2022-12-27 PROCEDURE — 3079F DIAST BP 80-89 MM HG: CPT | Mod: CPTII,S$GLB,, | Performed by: SURGERY

## 2022-12-27 PROCEDURE — 1160F RVW MEDS BY RX/DR IN RCRD: CPT | Mod: CPTII,S$GLB,, | Performed by: SURGERY

## 2022-12-27 PROCEDURE — 99999 PR PBB SHADOW E&M-EST. PATIENT-LVL III: CPT | Mod: PBBFAC,,, | Performed by: SURGERY

## 2022-12-27 PROCEDURE — 99999 PR PBB SHADOW E&M-EST. PATIENT-LVL III: ICD-10-PCS | Mod: PBBFAC,,, | Performed by: SURGERY

## 2022-12-27 PROCEDURE — 99213 PR OFFICE/OUTPT VISIT, EST, LEVL III, 20-29 MIN: ICD-10-PCS | Mod: S$GLB,,, | Performed by: SURGERY

## 2022-12-27 PROCEDURE — 1160F PR REVIEW ALL MEDS BY PRESCRIBER/CLIN PHARMACIST DOCUMENTED: ICD-10-PCS | Mod: CPTII,S$GLB,, | Performed by: SURGERY

## 2022-12-27 PROCEDURE — 3079F PR MOST RECENT DIASTOLIC BLOOD PRESSURE 80-89 MM HG: ICD-10-PCS | Mod: CPTII,S$GLB,, | Performed by: SURGERY

## 2022-12-27 PROCEDURE — 1159F MED LIST DOCD IN RCRD: CPT | Mod: CPTII,S$GLB,, | Performed by: SURGERY

## 2022-12-27 PROCEDURE — 1159F PR MEDICATION LIST DOCUMENTED IN MEDICAL RECORD: ICD-10-PCS | Mod: CPTII,S$GLB,, | Performed by: SURGERY

## 2022-12-27 PROCEDURE — 3008F BODY MASS INDEX DOCD: CPT | Mod: CPTII,S$GLB,, | Performed by: SURGERY

## 2022-12-27 PROCEDURE — 99213 OFFICE O/P EST LOW 20 MIN: CPT | Mod: S$GLB,,, | Performed by: SURGERY

## 2022-12-27 PROCEDURE — 1111F PR DISCHARGE MEDS RECONCILED W/ CURRENT OUTPATIENT MED LIST: ICD-10-PCS | Mod: CPTII,S$GLB,, | Performed by: SURGERY

## 2022-12-27 PROCEDURE — 3077F SYST BP >= 140 MM HG: CPT | Mod: CPTII,S$GLB,, | Performed by: SURGERY

## 2022-12-27 PROCEDURE — 1111F DSCHRG MED/CURRENT MED MERGE: CPT | Mod: CPTII,S$GLB,, | Performed by: SURGERY

## 2022-12-27 PROCEDURE — 3077F PR MOST RECENT SYSTOLIC BLOOD PRESSURE >= 140 MM HG: ICD-10-PCS | Mod: CPTII,S$GLB,, | Performed by: SURGERY

## 2022-12-27 NOTE — LETTER
December 27, 2022    Jeffrey Carlson  [address] [place] [address]         [facility]  [address]  [facility] [address]  Phone: [phone] December 27, 2022     Patient: Jeffrey Carlson   YOB: 1965   Date of Visit: 12/27/2022       To Whom It May Concern:    It is my medical opinion that Jeffrey Carlson may return to work 12/28/22 with No Restrictions.    If you have any questions or concerns, please don't hesitate to call.    Sincerely,        Anuel Irwin MD

## 2022-12-27 NOTE — PROGRESS NOTES
[facility]  OUTPATIENT Follow-Up      HPI: Jeffrey Carlson is a 57 y.o. male hospital f/u for food poisoning or viral GE.  Pt still feels somewhat easily fatigued, but he is back at work.  Tolerating diet w/o issue, no diarrhea.     I have reviewed the patient's chart including prior progress notes, procedures and testing.     ROS:   Review of Systems   All other systems reviewed and are negative.    PROBLEM LIST:  Patient Active Problem List   Diagnosis    Epigastric pain    Diarrhea         HISTORY  Past Medical History:   Diagnosis Date    Gout        Past Surgical History:   Procedure Laterality Date    HERNIA REPAIR      KNEE CARTILAGE SURGERY Right     NASAL SEPTUM SURGERY Bilateral     Congenital    WRIST SURGERY Right     Carpal Tunnel       Social History     Tobacco Use    Smoking status: Never    Smokeless tobacco: Never   Substance Use Topics    Alcohol use: Yes     Alcohol/week: 6.0 standard drinks     Types: 6 Cans of beer per week     Comment: ocassionally    Drug use: No       Family History   Problem Relation Age of Onset    Cancer Father          MEDS:  Current Outpatient Medications on File Prior to Visit   Medication Sig Dispense Refill    allopurinol (ZYLOPRIM) 100 MG tablet TAKE 1 TABLET TUES,THURS,SAT,SUN AND 1 & 1/2 TAB MWF TO LOWER URIC ACID AND PREVENT GOUT  3    gentamicin (GARAMYCIN) 0.3 % ophthalmic solution Place 1 drop into both eyes every 4 (four) hours. 5 mL 0    metFORMIN (GLUCOPHAGE) 500 MG tablet TAKE 1 TABLET BY MOUTH TWICE DAILY WITH FOOD FOR BLOOD GLUCOSE  3     No current facility-administered medications on file prior to visit.       ALLERGIES:  Review of patient's allergies indicates:  No Known Allergies      VITALS:  Vitals:    12/27/22 1052   BP: (!) 158/85   Pulse: 95         PHYSICAL EXAM:  Physical Exam  Constitutional:       Appearance: Normal appearance. He is obese.   Pulmonary:      Effort: Pulmonary effort is normal.   Abdominal:      Palpations:  Abdomen is soft.   Neurological:      Mental Status: Mental status is at baseline.   Psychiatric:         Mood and Affect: Mood normal.         Behavior: Behavior normal.         LABS:  Lab Results   Component Value Date    WBC 10.70 12/17/2022    RBC 4.70 12/17/2022    HGB 15.3 12/17/2022    HCT 45.4 12/17/2022     12/17/2022     Lab Results   Component Value Date     (H) 12/17/2022     12/17/2022    K 3.7 12/17/2022     12/17/2022    CO2 21 (L) 12/17/2022    BUN 14 12/17/2022    CREATININE 0.7 12/17/2022    CALCIUM 7.9 (L) 12/17/2022     Lab Results   Component Value Date    ALT 16 12/17/2022    AST 13 12/17/2022    ALKPHOS 53 (L) 12/17/2022    BILITOT 0.5 12/17/2022     No results found for: MG, PHOS      ASSESSMENT & PLAN:  57 y.o. male, no surgical issues.  Recommend f/u with PCP as well. RTC as needed.      Anuel Irwin M.D., F.A.C.S.  [facility]Ngnoy[facility] and General Surgery  [facility] [place]

## 2023-02-22 ENCOUNTER — OFFICE VISIT (OUTPATIENT)
Dept: URGENT CARE | Facility: CLINIC | Age: 58
End: 2023-02-22
Payer: COMMERCIAL

## 2023-02-22 VITALS
SYSTOLIC BLOOD PRESSURE: 185 MMHG | OXYGEN SATURATION: 96 % | HEART RATE: 91 BPM | BODY MASS INDEX: 43.13 KG/M2 | RESPIRATION RATE: 14 BRPM | WEIGHT: 315 LBS | DIASTOLIC BLOOD PRESSURE: 86 MMHG | TEMPERATURE: 102 F

## 2023-02-22 DIAGNOSIS — J20.9 ACUTE BRONCHITIS, UNSPECIFIED ORGANISM: Primary | ICD-10-CM

## 2023-02-22 DIAGNOSIS — H10.023 OTHER MUCOPURULENT CONJUNCTIVITIS OF BOTH EYES: ICD-10-CM

## 2023-02-22 LAB
CTP QC/QA: YES
CTP QC/QA: YES
POC MOLECULAR INFLUENZA A AGN: NEGATIVE
POC MOLECULAR INFLUENZA B AGN: NEGATIVE
SARS-COV-2 AG RESP QL IA.RAPID: NEGATIVE

## 2023-02-22 PROCEDURE — 1159F PR MEDICATION LIST DOCUMENTED IN MEDICAL RECORD: ICD-10-PCS | Mod: CPTII,S$GLB,, | Performed by: FAMILY MEDICINE

## 2023-02-22 PROCEDURE — 3008F BODY MASS INDEX DOCD: CPT | Mod: CPTII,S$GLB,, | Performed by: FAMILY MEDICINE

## 2023-02-22 PROCEDURE — 3079F PR MOST RECENT DIASTOLIC BLOOD PRESSURE 80-89 MM HG: ICD-10-PCS | Mod: CPTII,S$GLB,, | Performed by: FAMILY MEDICINE

## 2023-02-22 PROCEDURE — 71046 XR CHEST PA AND LATERAL: ICD-10-PCS | Mod: FY,S$GLB,, | Performed by: RADIOLOGY

## 2023-02-22 PROCEDURE — 87811 SARS-COV-2 COVID19 W/OPTIC: CPT | Mod: QW,S$GLB,, | Performed by: FAMILY MEDICINE

## 2023-02-22 PROCEDURE — 1160F PR REVIEW ALL MEDS BY PRESCRIBER/CLIN PHARMACIST DOCUMENTED: ICD-10-PCS | Mod: CPTII,S$GLB,, | Performed by: FAMILY MEDICINE

## 2023-02-22 PROCEDURE — 3008F PR BODY MASS INDEX (BMI) DOCUMENTED: ICD-10-PCS | Mod: CPTII,S$GLB,, | Performed by: FAMILY MEDICINE

## 2023-02-22 PROCEDURE — 3077F PR MOST RECENT SYSTOLIC BLOOD PRESSURE >= 140 MM HG: ICD-10-PCS | Mod: CPTII,S$GLB,, | Performed by: FAMILY MEDICINE

## 2023-02-22 PROCEDURE — 1160F RVW MEDS BY RX/DR IN RCRD: CPT | Mod: CPTII,S$GLB,, | Performed by: FAMILY MEDICINE

## 2023-02-22 PROCEDURE — 99214 PR OFFICE/OUTPT VISIT, EST, LEVL IV, 30-39 MIN: ICD-10-PCS | Mod: S$GLB,,, | Performed by: FAMILY MEDICINE

## 2023-02-22 PROCEDURE — 1159F MED LIST DOCD IN RCRD: CPT | Mod: CPTII,S$GLB,, | Performed by: FAMILY MEDICINE

## 2023-02-22 PROCEDURE — 71046 X-RAY EXAM CHEST 2 VIEWS: CPT | Mod: FY,S$GLB,, | Performed by: RADIOLOGY

## 2023-02-22 PROCEDURE — 3079F DIAST BP 80-89 MM HG: CPT | Mod: CPTII,S$GLB,, | Performed by: FAMILY MEDICINE

## 2023-02-22 PROCEDURE — 87502 INFLUENZA DNA AMP PROBE: CPT | Mod: QW,S$GLB,, | Performed by: FAMILY MEDICINE

## 2023-02-22 PROCEDURE — 3077F SYST BP >= 140 MM HG: CPT | Mod: CPTII,S$GLB,, | Performed by: FAMILY MEDICINE

## 2023-02-22 PROCEDURE — 87502 POCT INFLUENZA A/B MOLECULAR: ICD-10-PCS | Mod: QW,S$GLB,, | Performed by: FAMILY MEDICINE

## 2023-02-22 PROCEDURE — 99214 OFFICE O/P EST MOD 30 MIN: CPT | Mod: S$GLB,,, | Performed by: FAMILY MEDICINE

## 2023-02-22 PROCEDURE — 87811 SARS CORONAVIRUS 2 ANTIGEN POCT, MANUAL READ: ICD-10-PCS | Mod: QW,S$GLB,, | Performed by: FAMILY MEDICINE

## 2023-02-22 RX ORDER — ALBUTEROL SULFATE 90 UG/1
2 AEROSOL, METERED RESPIRATORY (INHALATION) EVERY 6 HOURS PRN
Qty: 18 G | Refills: 0 | Status: SHIPPED | OUTPATIENT
Start: 2023-02-22 | End: 2024-02-22

## 2023-02-22 RX ORDER — GENTAMICIN SULFATE 3 MG/ML
1 SOLUTION/ DROPS OPHTHALMIC EVERY 4 HOURS
Qty: 5 ML | Refills: 0 | Status: SHIPPED | OUTPATIENT
Start: 2023-02-22

## 2023-02-22 RX ORDER — PROMETHAZINE HYDROCHLORIDE AND DEXTROMETHORPHAN HYDROBROMIDE 6.25; 15 MG/5ML; MG/5ML
5 SYRUP ORAL NIGHTLY PRN
Qty: 120 ML | Refills: 0 | Status: SHIPPED | OUTPATIENT
Start: 2023-02-22 | End: 2023-03-04

## 2023-02-22 RX ORDER — ACETAMINOPHEN 500 MG
1000 TABLET ORAL
Status: COMPLETED | OUTPATIENT
Start: 2023-02-22 | End: 2023-02-22

## 2023-02-22 RX ORDER — DOXYCYCLINE 100 MG/1
100 CAPSULE ORAL 2 TIMES DAILY
Qty: 14 CAPSULE | Refills: 0 | Status: SHIPPED | OUTPATIENT
Start: 2023-02-22 | End: 2023-03-01

## 2023-02-22 RX ORDER — BENZONATATE 100 MG/1
100 CAPSULE ORAL EVERY 6 HOURS PRN
Qty: 30 CAPSULE | Refills: 1 | Status: SHIPPED | OUTPATIENT
Start: 2023-02-22 | End: 2024-02-22

## 2023-02-22 RX ADMIN — Medication 1000 MG: at 04:02

## 2023-02-22 NOTE — PROGRESS NOTES
Subjective:       Patient ID: Jeffrey Carlson is a 57 y.o. male.    Vitals:  weight is 144.2 kg (318 lb) (abnormal). His oral temperature is 102.3 °F (39.1 °C) (abnormal). His blood pressure is 185/86 (abnormal) and his pulse is 91. His respiration is 14 and oxygen saturation is 96%.     Chief Complaint: Chest Congestion and Fever (/)    This is a 57 y.o. male who presents today with a chief complaint of eyes were crusty and red, chest congestion, Coughing up brown mucus, fever (101 today), sweats, headache,   sore throat x Last Thursday     Home Tx: Tylenol, Ibuprofen, Dayquil, Nyquil, cough drops          Cough  The current episode started in the past 7 days. The problem has been gradually worsening. The cough is Productive of brown sputum. Associated symptoms include a fever, headaches, nasal congestion and sweats. Pertinent negatives include no chest pain, chills or ear pain. Associated symptoms comments: Slight shortness of breath. There is no history of asthma, bronchitis, COPD or pneumonia.     Constitution: Positive for fever. Negative for chills.   HENT:  Negative for ear pain.    Cardiovascular:  Negative for chest pain.   Respiratory:  Positive for cough.    Neurological:  Positive for headaches.     Objective:      Physical Exam   HENT:   Head: Normocephalic and atraumatic.   Nose: Congestion present.   Mouth/Throat: Mucous membranes are moist. Posterior oropharyngeal erythema present.   Eyes: Pupils are equal, round, and reactive to light. Right eye exhibits exudate. Left eye exhibits exudate. Right conjunctiva is injected. Left conjunctiva is injected. Extraocular movement intact   Neck: Neck supple.   Cardiovascular: Normal rate, regular rhythm, normal heart sounds and normal pulses.   Pulmonary/Chest: No stridor. He is in respiratory distress (mild). He has wheezes (right sided lung field). He has no rhonchi.   Abdominal: Normal appearance and bowel sounds are normal. Soft. There is no abdominal  tenderness.   Neurological: He is alert.   Nursing note and vitals reviewed.      Results for orders placed or performed in visit on 02/22/23   SARS Coronavirus 2 Antigen, POCT Manual Read   Result Value Ref Range    SARS Coronavirus 2 Antigen Negative Negative     Acceptable Yes    POCT Influenza A/B MOLECULAR   Result Value Ref Range    POC Molecular Influenza A Ag Negative Negative, Not Reported    POC Molecular Influenza B Ag Negative Negative, Not Reported     Acceptable Yes       Assessment:       1. Acute bronchitis, unspecified organism    2. Other mucopurulent conjunctivitis of both eyes          Plan:         Acute bronchitis, unspecified organism  -     SARS Coronavirus 2 Antigen, POCT Manual Read  -     POCT Influenza A/B MOLECULAR  -     acetaminophen tablet 1,000 mg  -     X-Ray Chest PA And Lateral  -     doxycycline (VIBRAMYCIN) 100 MG Cap; Take 1 capsule (100 mg total) by mouth 2 (two) times daily. for 7 days  Dispense: 14 capsule; Refill: 0  -     albuterol (PROAIR HFA) 90 mcg/actuation inhaler; Inhale 2 puffs into the lungs every 6 (six) hours as needed for Wheezing. Rescue  Dispense: 18 g; Refill: 0  -     benzonatate (TESSALON PERLES) 100 MG capsule; Take 1 capsule (100 mg total) by mouth every 6 (six) hours as needed for Cough.  Dispense: 30 capsule; Refill: 1  -     promethazine-dextromethorphan (PROMETHAZINE-DM) 6.25-15 mg/5 mL Syrp; Take 5 mLs by mouth nightly as needed (cough).  Dispense: 120 mL; Refill: 0    Other mucopurulent conjunctivitis of both eyes  -     gentamicin (GARAMYCIN) 0.3 % ophthalmic solution; Place 1 drop into both eyes every 4 (four) hours.  Dispense: 5 mL; Refill: 0    Discussed infection control. RTC prn worsening symptoms

## 2023-02-22 NOTE — LETTER
February 22, 2023      [facility]  [address]  [place] [address]  Phone: [phone]  Fax: [phone]       Patient: Jeffrey Carlson   YOB: 1965  Date of Visit: 02/22/2023    To Whom It May Concern:    Ryann Carlson  was at [facility] on 02/22/2023. The patient may return to work/school on 02/27/2023 with no restrictions. If you have any questions or concerns, or if I can be of further assistance, please do not hesitate to contact me.    Sincerely,              Lucio Long MD

## 2023-10-02 ENCOUNTER — OFFICE VISIT (OUTPATIENT)
Dept: URGENT CARE | Facility: CLINIC | Age: 58
End: 2023-10-02
Payer: COMMERCIAL

## 2023-10-02 VITALS
HEIGHT: 72 IN | RESPIRATION RATE: 17 BRPM | WEIGHT: 315 LBS | BODY MASS INDEX: 42.66 KG/M2 | SYSTOLIC BLOOD PRESSURE: 178 MMHG | OXYGEN SATURATION: 98 % | HEART RATE: 77 BPM | DIASTOLIC BLOOD PRESSURE: 81 MMHG | TEMPERATURE: 99 F

## 2023-10-02 DIAGNOSIS — R05.9 COUGH, UNSPECIFIED TYPE: ICD-10-CM

## 2023-10-02 DIAGNOSIS — J01.90 ACUTE BACTERIAL SINUSITIS: ICD-10-CM

## 2023-10-02 DIAGNOSIS — B30.9 VIRAL CONJUNCTIVITIS, RIGHT EYE: ICD-10-CM

## 2023-10-02 DIAGNOSIS — B96.89 ACUTE BACTERIAL SINUSITIS: ICD-10-CM

## 2023-10-02 DIAGNOSIS — B96.89 ACUTE BACTERIAL SINUSITIS: Primary | ICD-10-CM

## 2023-10-02 DIAGNOSIS — J01.90 ACUTE BACTERIAL SINUSITIS: Primary | ICD-10-CM

## 2023-10-02 PROCEDURE — 99213 PR OFFICE/OUTPT VISIT, EST, LEVL III, 20-29 MIN: ICD-10-PCS | Mod: S$GLB,,, | Performed by: NURSE PRACTITIONER

## 2023-10-02 PROCEDURE — 99213 OFFICE O/P EST LOW 20 MIN: CPT | Mod: S$GLB,,, | Performed by: NURSE PRACTITIONER

## 2023-10-02 RX ORDER — LEVOCETIRIZINE DIHYDROCHLORIDE 5 MG/1
5 TABLET, FILM COATED ORAL NIGHTLY
Qty: 90 TABLET | Refills: 0 | Status: SHIPPED | OUTPATIENT
Start: 2023-10-02

## 2023-10-02 RX ORDER — BENZONATATE 100 MG/1
100 CAPSULE ORAL 3 TIMES DAILY PRN
Qty: 30 CAPSULE | Refills: 0 | Status: SHIPPED | OUTPATIENT
Start: 2023-10-02 | End: 2023-10-12

## 2023-10-02 RX ORDER — AZELASTINE HYDROCHLORIDE 0.5 MG/ML
1 SOLUTION/ DROPS OPHTHALMIC 2 TIMES DAILY
Qty: 6 ML | Refills: 0 | Status: SHIPPED | OUTPATIENT
Start: 2023-10-02 | End: 2023-10-02

## 2023-10-02 RX ORDER — SEMAGLUTIDE 0.68 MG/ML
0.5 INJECTION, SOLUTION SUBCUTANEOUS
COMMUNITY
Start: 2023-09-06

## 2023-10-02 RX ORDER — FLUTICASONE PROPIONATE 50 MCG
2 SPRAY, SUSPENSION (ML) NASAL DAILY
Qty: 11.1 ML | Refills: 0 | Status: SHIPPED | OUTPATIENT
Start: 2023-10-02 | End: 2023-10-02

## 2023-10-02 RX ORDER — AZELASTINE HYDROCHLORIDE 0.5 MG/ML
SOLUTION/ DROPS OPHTHALMIC
Qty: 18 ML | Refills: 0 | Status: SHIPPED | OUTPATIENT
Start: 2023-10-02

## 2023-10-02 RX ORDER — ROSUVASTATIN CALCIUM 5 MG/1
5 TABLET, COATED ORAL
COMMUNITY
Start: 2023-08-01

## 2023-10-02 RX ORDER — AZITHROMYCIN 250 MG/1
TABLET, FILM COATED ORAL
Qty: 6 TABLET | Refills: 0 | Status: SHIPPED | OUTPATIENT
Start: 2023-10-02 | End: 2023-10-07

## 2023-10-02 RX ORDER — LEVOCETIRIZINE DIHYDROCHLORIDE 5 MG/1
5 TABLET, FILM COATED ORAL NIGHTLY
Qty: 30 TABLET | Refills: 0 | Status: SHIPPED | OUTPATIENT
Start: 2023-10-02 | End: 2023-10-02

## 2023-10-02 RX ORDER — FLUTICASONE PROPIONATE 50 MCG
SPRAY, SUSPENSION (ML) NASAL
Qty: 48 G | Refills: 0 | Status: SHIPPED | OUTPATIENT
Start: 2023-10-02

## 2023-10-02 RX ORDER — PROMETHAZINE HYDROCHLORIDE AND DEXTROMETHORPHAN HYDROBROMIDE 6.25; 15 MG/5ML; MG/5ML
5 SYRUP ORAL EVERY 4 HOURS PRN
Qty: 240 ML | Refills: 0 | Status: SHIPPED | OUTPATIENT
Start: 2023-10-02 | End: 2023-10-12

## 2023-10-02 NOTE — PROGRESS NOTES
"Subjective:      Patient ID: Jeffrey Carlson is a 58 y.o. male.    Vitals:  height is 6' 0.01" (1.829 m) and weight is 146.7 kg (323 lb 6.6 oz) (abnormal). His oral temperature is 98.7 °F (37.1 °C). His blood pressure is 178/81 (abnormal) and his pulse is 77. His respiration is 17 and oxygen saturation is 98%.     Chief Complaint: Sinus Problem    57yo male pt reports BL ear fullness and discomfort, chest congestion, cough, scratchy throat, and worsening sinus pressure over the past 2.5 weeks with fatigue that is not improving.  Reports that his R eye is also reddened and irritated with crusting in the morning.  Denies fever/chills, but reports a few incidents of waking up sweating.  Denies n/v/d, denies abd pain.  Denies chest pain, wheezing, or SOB.  Denies known sick contacts.  Reports no improvement with OTC cough medicine.    Sinus Problem  This is a new problem. The current episode started 1 to 4 weeks ago. There has been no fever. Associated symptoms include congestion, coughing and headaches. Pertinent negatives include no chills, diaphoresis, ear pain, hoarse voice, neck pain, shortness of breath, sinus pressure, sneezing, sore throat or swollen glands. Past treatments include oral decongestants.       Constitution: Negative for chills, sweating and fever.   HENT:  Positive for congestion and postnasal drip. Negative for ear pain, sinus pressure and sore throat.    Neck: Negative for neck pain.   Cardiovascular:  Negative for chest pain.   Respiratory:  Positive for cough. Negative for chest tightness, sputum production, shortness of breath and wheezing.    Gastrointestinal:  Negative for abdominal pain, nausea, vomiting and diarrhea.   Allergic/Immunologic: Negative for sneezing.   Neurological:  Positive for headaches.      Objective:     Physical Exam   Constitutional: He is oriented to person, place, and time. He appears well-developed. He is cooperative.  Non-toxic appearance. He does not appear " ill. No distress.   HENT:   Head: Normocephalic and atraumatic.   Ears:   Right Ear: Hearing, external ear and ear canal normal. Tympanic membrane is bulging. Tympanic membrane is not erythematous and not retracted. A middle ear effusion (clear fluid) is present.   Left Ear: Hearing, external ear and ear canal normal. Tympanic membrane is bulging. Tympanic membrane is not erythematous and not retracted. A middle ear effusion (clear fluid) is present.   Nose: Mucosal edema (erythema and swelling to BL turbinates), purulent discharge (small amount to BL nares) and sinus tenderness (mild) present. No rhinorrhea or nasal deformity. No epistaxis. Right sinus exhibits maxillary sinus tenderness. Right sinus exhibits no frontal sinus tenderness. Left sinus exhibits maxillary sinus tenderness. Left sinus exhibits no frontal sinus tenderness.   Mouth/Throat: Uvula is midline and mucous membranes are normal. No trismus in the jaw. Normal dentition. No uvula swelling. Oropharyngeal exudate (cloudy postnasal drip) and posterior oropharyngeal erythema (mild) present. No posterior oropharyngeal edema. Tonsils are 1+ on the right. Tonsils are 1+ on the left. No tonsillar exudate.   Eyes: Lids are normal. Right eye exhibits exudate (yellow watery discharge with crusting). Right eye exhibits no chemosis, no discharge and no hordeolum. No foreign body present in the right eye. Left eye exhibits no chemosis, no discharge, no exudate and no hordeolum. No foreign body present in the left eye. Right conjunctiva is injected (mild). Right conjunctiva has no hemorrhage. Left conjunctiva is not injected. Left conjunctiva has no hemorrhage. No scleral icterus. Extraocular movement intact vision grossly intact gaze aligned appropriately   Neck: Trachea normal and phonation normal. Neck supple. No edema present. No erythema present. No neck rigidity present.   Cardiovascular: Normal rate, regular rhythm, normal heart sounds and normal pulses.    Pulmonary/Chest: Effort normal and breath sounds normal. No accessory muscle usage or stridor. No tachypnea. No respiratory distress. He has no decreased breath sounds. He has no wheezes. He has no rhonchi. He has no rales.   Abdominal: Normal appearance.   Musculoskeletal: Normal range of motion.         General: No deformity. Normal range of motion.   Lymphadenopathy:        Head (right side): No submandibular adenopathy present.        Head (left side): No submandibular adenopathy present.     He has no cervical adenopathy.   Neurological: He is alert and oriented to person, place, and time. He exhibits normal muscle tone. Coordination normal.   Skin: Skin is warm, dry, intact, not diaphoretic and not pale.   Psychiatric: His speech is normal and behavior is normal. Judgment and thought content normal.   Nursing note and vitals reviewed.      Assessment:     1. Acute bacterial sinusitis    2. Cough, unspecified type    3. Viral conjunctivitis, right eye        Plan:     Provided education on prescribed medications, recommended adding NSAIDs and cool compresses to sinuses for additional symptom relief.  Provided education on return/ER precautions.  Pt verbalized understanding and agreed to plan.      Acute bacterial sinusitis  -     fluticasone propionate (FLONASE) 50 mcg/actuation nasal spray; 2 sprays (100 mcg total) by Each Nostril route once daily.  Dispense: 11.1 mL; Refill: 0  -     levocetirizine (XYZAL) 5 MG tablet; Take 1 tablet (5 mg total) by mouth every evening.  Dispense: 30 tablet; Refill: 0  -     azithromycin (Z-GUALBERTO) 250 MG tablet; Take 2 tablets by mouth on day 1; Take 1 tablet by mouth on days 2-5  Dispense: 6 tablet; Refill: 0    Cough, unspecified type  -     benzonatate (TESSALON) 100 MG capsule; Take 1 capsule (100 mg total) by mouth 3 (three) times daily as needed for Cough.  Dispense: 30 capsule; Refill: 0  -     promethazine-dextromethorphan (PROMETHAZINE-DM) 6.25-15 mg/5 mL Syrp; Take 5  "mLs by mouth every 4 (four) hours as needed (cough).  Dispense: 240 mL; Refill: 0    Viral conjunctivitis, right eye  -     azelastine (OPTIVAR) 0.05 % ophthalmic solution; Place 1 drop into the right eye 2 (two) times daily.  Dispense: 6 mL; Refill: 0      Patient Instructions   ,If your condition worsens or fails to improve, we recommend that you receive another evaluation at the ER immediately, contact your PCP to discuss your concerns, or return here.  You must understand that you've received an urgent care treatment only, and that you may be released before all your medical problems are known or treated.  You, the patient, will arrange for follow-up care as instructed.     If we discussed that I think your illness is viral, it will not respond to antibiotics and it will last 10-14 days.  However, if we discussed a "wait and see" approach, if over the next few days the symptoms worsen, start the antibiotics I have given you.  If we discussed that you require antibiotics now, start them now and take them to completion.     If you are female and on birth control pills and take the antibiotics, use additional methods to prevent pregnancy while on the antibiotics and for one cycle after.     Flonase (fluticasone) is a nasal spray which is available over the counter and may help with your symptoms.  Zyrtec D, Claritin D, or Allegra D can also help with symptoms of congestion and drainage.  If you have hypertension avoid using the "D" formula, which is the decongestant.  If you just have drainage, you can take plain Zyrtec, Claritin, or Allegra.  If you just have a congested feeling, you can take pseudoephedrine (unless you have high blood pressure) which you have to sign for behind the counter.  Do not buy phenylephrine OTC, as it is not effective     Rest and fluids are also important.  Tylenol or Ibuprofen can also be used as directed for pain, unless you have an allergy to them or medical condition (such as " stomach ulcers, kidney or liver disease, or use blood thinners, etc.) for which you should not be taking these type of medications.     If you are flying in the next few days, Afrin nose drops for the airplane flight upon take off and landing may help.  Other than at those times, refrain from using Afrin.     If you were prescribed a narcotic or sedating cough medicine, do not drive or operate heavy machinery while taking these medications.

## 2023-10-02 NOTE — PATIENT INSTRUCTIONS
",If your condition worsens or fails to improve, we recommend that you receive another evaluation at the ER immediately, contact your PCP to discuss your concerns, or return here.  You must understand that you've received an urgent care treatment only, and that you may be released before all your medical problems are known or treated.  You, the patient, will arrange for follow-up care as instructed.     If we discussed that I think your illness is viral, it will not respond to antibiotics and it will last 10-14 days.  However, if we discussed a "wait and see" approach, if over the next few days the symptoms worsen, start the antibiotics I have given you.  If we discussed that you require antibiotics now, start them now and take them to completion.     If you are female and on birth control pills and take the antibiotics, use additional methods to prevent pregnancy while on the antibiotics and for one cycle after.     Flonase (fluticasone) is a nasal spray which is available over the counter and may help with your symptoms.  Zyrtec D, Claritin D, or Allegra D can also help with symptoms of congestion and drainage.  If you have hypertension avoid using the "D" formula, which is the decongestant.  If you just have drainage, you can take plain Zyrtec, Claritin, or Allegra.  If you just have a congested feeling, you can take pseudoephedrine (unless you have high blood pressure) which you have to sign for behind the counter.  Do not buy phenylephrine OTC, as it is not effective     Rest and fluids are also important.  Tylenol or Ibuprofen can also be used as directed for pain, unless you have an allergy to them or medical condition (such as stomach ulcers, kidney or liver disease, or use blood thinners, etc.) for which you should not be taking these type of medications.     If you are flying in the next few days, Afrin nose drops for the airplane flight upon take off and landing may help.  Other than at those times, " refrain from using Afrin.     If you were prescribed a narcotic or sedating cough medicine, do not drive or operate heavy machinery while taking these medications.

## 2024-12-10 NOTE — SUBJECTIVE & OBJECTIVE
No current facility-administered medications on file prior to encounter.     Current Outpatient Medications on File Prior to Encounter   Medication Sig    allopurinol (ZYLOPRIM) 100 MG tablet TAKE 1 TABLET TUES,THURS,SAT,SUN AND 1 & 1/2 TAB MWF TO LOWER URIC ACID AND PREVENT GOUT    gentamicin (GARAMYCIN) 0.3 % ophthalmic solution Place 1 drop into both eyes every 4 (four) hours.    metFORMIN (GLUCOPHAGE) 500 MG tablet TAKE 1 TABLET BY MOUTH TWICE DAILY WITH FOOD FOR BLOOD GLUCOSE       Review of patient's allergies indicates:  No Known Allergies    Past Medical History:   Diagnosis Date    Gout      Past Surgical History:   Procedure Laterality Date    HERNIA REPAIR      KNEE CARTILAGE SURGERY Right     NASAL SEPTUM SURGERY Bilateral     Congenital    WRIST SURGERY Right     Carpal Tunnel     Family History       Problem Relation (Age of Onset)    Cancer Father          Tobacco Use    Smoking status: Never    Smokeless tobacco: Never   Substance and Sexual Activity    Alcohol use: Yes     Alcohol/week: 6.0 standard drinks     Types: 6 Cans of beer per week     Comment: ocassionally    Drug use: No    Sexual activity: Not on file     Review of Systems   Constitutional:  Negative for activity change, appetite change, chills and fever.   Respiratory:  Negative for chest tightness and shortness of breath.    Cardiovascular:  Negative for chest pain.   Gastrointestinal:  Positive for abdominal pain and diarrhea. Negative for abdominal distention, anal bleeding, blood in stool, constipation, nausea, rectal pain and vomiting.   Objective:     Vital Signs (Most Recent):  Temp: 97.8 °F (36.6 °C) (12/18/22 1217)  Pulse: 66 (12/18/22 1217)  Resp: 16 (12/18/22 1217)  BP: (!) 142/73 (12/18/22 1217)  SpO2: 95 % (12/18/22 1217)   Vital Signs (24h Range):  Temp:  [97.7 °F (36.5 °C)-98.8 °F (37.1 °C)] 97.8 °F (36.6 °C)  Pulse:  [] 66  Resp:  [13-20] 16  SpO2:  [93 %-98 %] 95 %  BP: (133-159)/(63-75) 142/73     Weight: (!)  142.8 kg (314 lb 13.1 oz)  Body mass index is 42.7 kg/m².    Physical Exam  Vitals reviewed.   Constitutional:       General: He is not in acute distress.     Appearance: He is obese. He is not ill-appearing or toxic-appearing.   Cardiovascular:      Rate and Rhythm: Normal rate and regular rhythm.   Pulmonary:      Effort: Pulmonary effort is normal. No respiratory distress.   Abdominal:      General: There is no distension.      Palpations: Abdomen is soft.      Tenderness: There is no abdominal tenderness. There is no guarding or rebound.   Musculoskeletal:      Right lower leg: No edema.      Left lower leg: No edema.   Skin:     General: Skin is warm and dry.      Capillary Refill: Capillary refill takes less than 2 seconds.   Neurological:      Mental Status: He is alert. Mental status is at baseline.       Significant Labs:  I have reviewed all pertinent lab results within the past 24 hours.  CBC:   Recent Labs   Lab 12/17/22  1904   WBC 10.70   RBC 4.70   HGB 15.3   HCT 45.4      MCV 97   MCH 32.6*   MCHC 33.7     CMP:   Recent Labs   Lab 12/17/22  1904   *   CALCIUM 7.9*   ALBUMIN 3.4*   PROT 6.4      K 3.7   CO2 21*      BUN 14   CREATININE 0.7   ALKPHOS 53*   ALT 16   AST 13   BILITOT 0.5       Significant Diagnostics:  I have reviewed all pertinent imaging results/findings within the past 24 hours.     Quality 130: Documentation Of Current Medications In The Medical Record: Current Medications Documented Detail Level: Detailed